# Patient Record
Sex: MALE | Race: WHITE | Employment: OTHER | URBAN - METROPOLITAN AREA
[De-identification: names, ages, dates, MRNs, and addresses within clinical notes are randomized per-mention and may not be internally consistent; named-entity substitution may affect disease eponyms.]

---

## 2017-12-14 ENCOUNTER — HOSPITAL ENCOUNTER (OUTPATIENT)
Dept: PHYSICAL THERAPY | Age: 77
Discharge: HOME | End: 2017-12-14
Payer: MEDICARE

## 2017-12-14 PROCEDURE — G8979 MOBILITY GOAL STATUS: HCPCS | Performed by: PHYSICAL THERAPIST

## 2017-12-14 PROCEDURE — 97110 THERAPEUTIC EXERCISES: CPT | Performed by: PHYSICAL THERAPIST

## 2017-12-14 PROCEDURE — G8978 MOBILITY CURRENT STATUS: HCPCS | Performed by: PHYSICAL THERAPIST

## 2017-12-14 PROCEDURE — 97161 PT EVAL LOW COMPLEX 20 MIN: CPT | Performed by: PHYSICAL THERAPIST

## 2017-12-14 NOTE — PROGRESS NOTES
PT DAILY TREATMENT NOTE/LUMBAR EVAL 3-16    Patient Name: Daniel Luciano  Date:2017  : 1940  [x]  Patient  Verified  [place]or: [place] MED[place] / Plan: [place] MEDICARE PART A / Product Type: Medicare /    In time:12:00  Out time:1:05  Total Treatment Time (min): 65  Total Timed Codes (min): 30  1:1 Treatment Time ( only): 65   Visit #: 1 of 8    Treatment Area: Lumbar pain [M54.5]  SUBJECTIVE  Pain Level (0-10 scale): 5  []constant []intermittent []improving []worsening []no change since onset    Any medication changes, allergies to medications, adverse drug reactions, diagnosis change, or new procedure performed?: [x] No    [] Yes (see summary sheet for update)  Subjective functional status/changes:     Patient has CC of low back pain and radiating pain into R anterior thigh for 2 years length of time. BELLE: denies. Patient describes pain as sharp, intermittent (it is not every day). Pain is worse in PM. Denies numbness/tingling. Denies popping/clicking. Aggravating factors: prolonged standing. Alleviating factors: hits the leg, time, stretching. Denies red flags: SOB, chest pain, dizziness/lightheadedness, blurred/double vision, HA, chills/fevers, night sweats, change in bowel/bladder control, abdominal pain, difficulty swallowing, slurred speech, unexplained weight gain/loss. 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 Surgical Hx: lumbar surgery (~4 years ago), upper back surgery , asbestos plaque removal in lung () . Social Hx: 2 level home (does not need access), alcohol, denies tobacco (hx of light smoking from 20yrs to ), work status: [employer] 15yrs. PLOF: independent with ADLs, sedentary outside of work. CLOF: same.        OBJECTIVE/EXAMINATION    35 min [x]Eval                  []Re-Eval       30 min Therapeutic Exercise:  [x] See flow sheet :   Rationale: increase ROM, increase strength and decrease pain to improve the patients ability to complete ADLs            With   [] TE   [] TA   [] neuro   [] other: Patient Education: [x] Review HEP    [] Progressed/Changed HEP based on:   [] positioning   [] body mechanics   [] transfers   [] heat/ice application    [] other:        Physical Therapy Evaluation - Lumbar Spine ([facility])    General Health:  Red Flags Indicated?  [] Yes    [x] No    Past History/Treatments:     Diagnostic Tests: [] Lab work [] X-rays    [] CT [] MRI     [] Other:  Results:    OBJECTIVE  Posture:  Lateral Shift: [] R    [] L     [] +  [x] -  Kyphosis: [x] Increased [] Decreased   []  WNL  Lordosis:  [] Increased [x] Decreased   [] WNL  Pelvic symmetry: [x] WNL    [] Other:    Gait:  [x] Normal     [] Abnormal:    Active Movements: [] N/A   [] Too acute   [] Other:  ROM % AROM Comments:pain, area   Forward flexion 40-60 75% tightness   Extension 20-30 50% Minor pain   SB right 20-30 50% pain   SB left 20-30 50%    Rotation right 5-10 75%    Rotation left 5-10 75%      Repeated Movements   Effects on present pain: produces (VT), abolishes (A), increases (incr), decreases (decr), centralizes (C), peripheral (PH), no effect (NE)   Pre-Test Sx Flexion Repeated Flexion Extension Repeated Extension   Sitting none       Standing none NE NE NE NE   Lying none           Neuro Screen [] WNL  DTRs 2+ bilaterally symmetric throughout L3/4 and S1    Dural Mobility:  SLR  Supine: [x] R    [] L    [x] +    [] -  @ (degrees):   Slump Test: [x] R    [] L    [x] +    [] -  @ (degrees):   Prone Knee Bend: [] R    [] L    [] +    [x] -     Palpation  [x] Min  [] Mod  [] Severe    Location: PA mobs to lumbar spine  [] Min  [] Mod  [] Severe    Location:  [] Min  [] Mod  [] Severe    Location:    Strength   L(0-5) R (0-5) N/T   Hip Flexion (L1,2) 4 4 []   Knee Extension (L3,4) 5 5 []   Ankle Dorsiflexion (L4) 4 5 []   Great Toe Extension (L5)   [x]   Ankle Plantarflexion (S1)   [x]   Knee Flexion (S1,2) 4 4 []   Upper Abdominals 4 4 []   Lower Abdominals 4 4 []   Paraspinals 4 4 []   Back Rotators 4 4 []   Gluteus Socrates 4- 4- []   Hip abuctors 4 4 []     Special Tests  Lumbar:  Lumb. Compression: [] Pos  [] Neg               Lumbar Distraction:   [] Pos  [] Neg    Quadrant:  [] Pos  [] Neg   [] Flex  [] Ext    Sacroilliac:  Gaenslen's: [] R    [] L    [] +    [x] -     Thigh Thrust: [] R    [] L    [] +    [x] -          Hip: Wander Harvey:  [] R    [] L    [] +    [x] -     Scour:  [] R    [] L    [] +    [x] -     Piriformis: [] R    [] L    [] +    [x] -        Pain Level (0-10 scale) post treatment: 5    ASSESSMENT/Changes in Function: Patient presents with signs/symptoms of R sided lumbar radiculopathy, with apparent extension directional preference. Patient will continue to benefit from skilled PT services to modify and progress therapeutic interventions, address functional mobility deficits, address ROM deficits, address strength deficits, analyze and address soft tissue restrictions, analyze and cue movement patterns, analyze and modify body mechanics/ergonomics and assess and modify postural abnormalities to attain remaining goals.      [x]  See Plan of Care  []  See progress note/recertification  []  See Discharge Summary         Progress towards goals / Updated goals:  See POC    PLAN  []  Upgrade activities as tolerated     [x]  Continue plan of care  []  Update interventions per flow sheet       []  Discharge due to:_  []  Other:_      Amber Thompson PT, DPT 12/14/2017  8:19 AM

## 2017-12-14 NOTE — PROGRESS NOTES
[facility]  [address] [place], [address] [facility] [place]  Ph [phone]  Fx [phone]    Plan of Care/ Statement of Necessity for Physical Therapy Services    Patient name: Juice Cruz Start of Care: 2017   Referral source: Stacey Fleming,* : 1940    Medical Diagnosis: Lumbar pain [M54.5]   Onset Date:2 years    Treatment Diagnosis: low back pain   Prior Hospitalization: see medical history Provider#: [id number]   Medications: Verified on Patient summary List    Comorbidities: pacemaker, hx of tobacco use   Prior Level of Function: independent with ADLs, sedentary outside of work      The Plan of Care and following information is based on the information from the initial evaluation. Assessment/ key information: Patient presents with signs/symptoms of R sided lumbar radiculopathy, with apparent extension directional preference. Patient will continue to benefit from skilled PT services to modify and progress therapeutic interventions, address functional mobility deficits, address ROM deficits, address strength deficits, analyze and address soft tissue restrictions, analyze and cue movement patterns, analyze and modify body mechanics/ergonomics and assess and modify postural abnormalities to attain remaining goals.     Evaluation Complexity History MEDIUM  Complexity : 1-2 comorbidities / personal factors will impact the outcome/ POC ; Examination LOW Complexity : 1-2 Standardized tests and measures addressing body structure, function, activity limitation and / or participation in recreation  ;Presentation LOW Complexity : Stable, uncomplicated  ;Clinical Decision Making MEDIUM Complexity : FOTO score of 26-74  Overall Complexity Rating: LOW   Problem List: pain affecting function, decrease ROM, decrease strength, decrease ADL/ functional abilitiies and decrease activity tolerance   Treatment Plan may include any combination of the following: Therapeutic exercise, Therapeutic activities, Neuromuscular re-education, Physical agent/modality, Gait/balance training, Manual therapy, Aquatic therapy, Patient education and Self Care training  Patient / Family readiness to learn indicated by: asking questions, trying to perform skills and interest  Persons(s) to be included in education: patient (P)  Barriers to Learning/Limitations: None  Patient Goal (s): less pain  Patient Self Reported Health Status: good  Rehabilitation Potential: fair    Short Term Goals: To be accomplished in 2 weeks:   Patient will report compliance with HEP 1x/day to aid in rehabilitation program.    Status at IE: NA   Current:     Patient will increase lumbar ROM to 100% in all planes to aid in completion of ADLs   Status at IE:50% grossly   Current:    Long Term Goals: To be accomplished in 4 weeks:   Patient will increase B LE strength to 5/5 MMT throughout to aid in completion of ADLs   Status at IE: 4/5 grossly   Current:     Patient will report pain no greater than 1-2/10 throughout entire day to aid in completion of ADLs   Status at IE: 3 at lowest   Current:     Patent will be able to stand for 2hr with no pain to be able to complete ADLs and work unrestricted. Status at [address] onset at 45-1hr   Current:     Patient will improve FOTO score to  points to demonstrate improvement in functional status. Status at IE: TBD   Current:    G-Codes (GP)  Mobility  [id number] Current  CK= 40-59%   Goal  CJ= 20-39%    The severity rating is based on clinical judgment and the FOTO score. Frequency / Duration: Patient to be seen 2 times per week for 4 weeks.     Patient/ Caregiver education and instruction: Diagnosis, prognosis, self care, activity modification and exercises   [x]  Plan of care has been reviewed with DARIN Enriquez PT, DPT 12/14/2017 1:27 PM    ________________________________________________________________________    I certify that the above Therapy Services are being furnished while the patient is under my care. I agree with the treatment plan and certify that this therapy is necessary. Physician's Signature:____________________  Date:____________Time: _________    Please sign and return to [facility]  [address], [address]  Ph [phone]  Fx [phone]            ________________________________________________________________________    I certify that the above Therapy Services are being furnished while the patient is under my care. I agree with the treatment plan and certify that this therapy is necessary.     Physician's Signature:____________________  Date:____________Time: _________    Please sign and return to [facility]  [address], [address]  Ph [phone]  Fx [phone]

## 2017-12-19 ENCOUNTER — HOSPITAL ENCOUNTER (OUTPATIENT)
Dept: PHYSICAL THERAPY | Age: 77
Discharge: HOME | End: 2017-12-19
Payer: MEDICARE

## 2017-12-19 PROCEDURE — 97110 THERAPEUTIC EXERCISES: CPT

## 2017-12-19 NOTE — PROGRESS NOTES
PT DAILY TREATMENT NOTE - [facility]     Patient Name: Silas Solares  Date:2017  : 1940  [x]  Patient  Verified  Payor: Linder Cheadle / Plan: [place] MEDICARE PART A / Product Type: Medicare /    In time:230  Out time:221  Total Treatment Time (min): 51  Total Timed Codes (min): 41  1:1 Treatment Time ( only): na   Visit #: 2 of 8    Treatment Area: Lumbar pain [M54.5]    SUBJECTIVE  Pain Level (0-10 scale): 4  Any medication changes, allergies to medications, adverse drug reactions, diagnosis change, or new procedure performed?: [x] No    [] Yes (see summary sheet for update)  Subjective functional status/changes:   [x] No changes reported      OBJECTIVE    Modality rationale: decrease pain and increase tissue extensibility   Min Type Additional Details    [] Estim:  []Unatt       []IFC  []Premod                        []Other:  []w/ice   []w/heat  Position:  Location:    [] Estim: []Att    []TENS instruct  []NMES                    []Other:  []w/US   []w/ice   []w/heat  Position:  Location:    []  Traction: [] Cervical       []Lumbar                       [] Prone          []Supine                       []Intermittent   []Continuous Lbs:  [] before manual  [] after manual    []  Ultrasound: []Continuous   [] Pulsed                           []1MHz   []3MHz W/cm2:  Location:    []  Iontophoresis with dexamethasone         Location: [] Take home patch   [] In clinic   10 []  Ice     [x]  heat  []  Ice massage  []  Laser   []  Anodyne Position:supine  Location:LS    []  Laser with stim  []  Other:  Position:  Location:    []  Vasopneumatic Device Pressure:       [] lo [] med [] hi   Temperature: [] lo [] med [] hi   [] Skin assessment post-treatment:  []intact []redness- no adverse reaction    []redness  adverse reaction:     41 min Therapeutic Exercise:  [] See flow sheet :   Rationale: increase ROM, increase strength and improve coordination           With   [] TE   [] TA   [] neuro   [] other: Patient Education: [x] Review HEP    [] Progressed/Changed HEP based on:   [] positioning   [] body mechanics   [] transfers   [] heat/ice application    [] other:      Other Objective/Functional Measures:   Most pain with fed flexion and bending    Pain Level (0-10 scale) post treatment: 2    ASSESSMENT/Changes in Function:   No relief or increase of symptoms with ext. Increased pain with flexion    Patient will continue to benefit from skilled PT services to modify and progress therapeutic interventions, address functional mobility deficits, address ROM deficits, address strength deficits and analyze and address soft tissue restrictions to attain remaining goals. [x]  See Plan of Care  []  See progress note/recertification  []  See Discharge Summary         Progress towards goals / Updated goals:  Short Term Goals: To be accomplished in 2 weeks:                        Patient will report compliance with HEP 1x/day to aid in rehabilitation program.                         Status at IE: NA                        Current:NT                           Patient will increase lumbar ROM to 100% in all planes to aid in completion of ADLs                        Status at IE:50% grossly                        Current:no change     Long Term Goals: To be accomplished in 4 weeks:                        Patient will increase B LE strength to 5/5 MMT throughout to aid in completion of ADLs                        Status at IE: 4/5 grossly                        Current:NT                           Patient will report pain no greater than 1-2/10 throughout entire day to aid in completion of ADLs                        Status at IE: 3 at lowest                        Current:NT                           Patent will be able to stand for 2hr with no pain to be able to complete ADLs and work unrestricted.                         Status at [address] onset at 45-1hr                        Current:NT                           Patient will improve FOTO score to  points to demonstrate improvement in functional status.                         Status at IE: TBD                        Current:NT       PLAN  [x]  Upgrade activities as tolerated     [x]  Continue plan of care  []  Update interventions per flow sheet       []  Discharge due to:_  []  Other:_      Renay Obando PTA 12/19/2017  4:09 PM    Future Appointments  Date Time Provider Justus Aguilar   12/21/2017 2:30 PM Renay [place], [facility]   12/26/2017 2:30 PM Renay [place], [facility]   12/28/2017 3:00 PM Renay [place], [facility]   1/2/2018 2:00 PM Renay [place], [facility]   1/4/2018 2:30 PM Renay [place], [facility]   1/9/2018 2:00 PM Renay [place], [facility]   1/11/2018 2:00 PM Renay [place], [facility]

## 2017-12-21 ENCOUNTER — HOSPITAL ENCOUNTER (OUTPATIENT)
Dept: PHYSICAL THERAPY | Age: 77
Discharge: HOME | End: 2017-12-21
Payer: MEDICARE

## 2017-12-21 PROCEDURE — 97110 THERAPEUTIC EXERCISES: CPT

## 2017-12-21 NOTE — PROGRESS NOTES
PT DAILY TREATMENT NOTE - [facility]     Patient Name: Jeannine Judge  Date:2017  : 1940  [x]  Patient  Verified  Payor: Dewayne Pacheco / Plan: [place] MEDICARE PART A / Product Type: Medicare /    In time:235  Out time:315  Total Treatment Time (min): 40  Total Timed Codes (min): 30  1:1 Treatment Time ( only): na   Visit #: 3 of 8    Treatment Area: Lumbar pain [M54.5]    SUBJECTIVE  Pain Level (0-10 scale): 5-6  Any medication changes, allergies to medications, adverse drug reactions, diagnosis change, or new procedure performed?: [x] No    [] Yes (see summary sheet for update)  Subjective functional status/changes:   [] No changes reported  \"just came from work, I was standing all day\"    OBJECTIVE    Modality rationale: decrease pain and increase tissue extensibility   Min Type Additional Details    [] Estim:  []Unatt       []IFC  []Premod                        []Other:  []w/ice   []w/heat  Position:  Location:    [] Estim: []Att    []TENS instruct  []NMES                    []Other:  []w/US   []w/ice   []w/heat  Position:  Location:    []  Traction: [] Cervical       []Lumbar                       [] Prone          []Supine                       []Intermittent   []Continuous Lbs:  [] before manual  [] after manual    []  Ultrasound: []Continuous   [] Pulsed                           []1MHz   []3MHz W/cm2:  Location:    []  Iontophoresis with dexamethasone         Location: [] Take home patch   [] In clinic   10 []  Ice     [x]  heat  []  Ice massage  []  Laser   []  Anodyne Position:supine  Location:LS    []  Laser with stim  []  Other:  Position:  Location:    []  Vasopneumatic Device Pressure:       [] lo [] med [] hi   Temperature: [] lo [] med [] hi   [] Skin assessment post-treatment:  []intact []redness- no adverse reaction    []redness  adverse reaction:         30 min Therapeutic Exercise:  [] See flow sheet :   Rationale: increase ROM, increase strength and improve coordination With   [] TE   [] TA   [] neuro   [] other: Patient Education: [x] Review HEP    [] Progressed/Changed HEP based on:   [] positioning   [] body mechanics   [] transfers   [] heat/ice application    [] other:      Other Objective/Functional Measures:   none     Pain Level (0-10 scale) post treatment: 5-6    ASSESSMENT/Changes in Function:   Focus on ext based TE with no significant decrease in symptoms but no increase of symptoms. Most discomfort while performing Add squeeze with ball in hooklying    Patient will continue to benefit from skilled PT services to modify and progress therapeutic interventions, address functional mobility deficits, address ROM deficits and address strength deficits to attain remaining goals.      [x]  See Plan of Care  []  See progress note/recertification  []  See Discharge Summary         Progress towards goals / Updated goals:  Short Term Goals: To be accomplished in 2 weeks:                        Patient will report compliance with HEP 1x/day to aid in rehabilitation program.                         CPYQGN at IE: NA                        Current:NT                            Patient will increase lumbar ROM to 100% in all planes to aid in completion of ADLs                        Status at IE:50% grossly                        Current:no change      Long Term Goals: To be accomplished in 4 weeks:                        Patient will increase B LE strength to 5/5 MMT throughout to aid in completion of ADLs                        Status at IE: 4/5 grossly                        Current:NT                            Patient will report pain no greater than 1-2/10 throughout entire day to aid in completion of ADLs                        Status at IE: 3 at lowest                        Current:NT                            Patent will be able to stand for 2hr with no pain to be able to complete ADLs and work unrestricted.                        Status at [address] onset at 45-1hr                        Current:NT                            [id number] will improve FOTO score to  points to demonstrate improvement in functional status.                         VATOSW at IE: TBD                        Current:NT       PLAN  [x]  Upgrade activities as tolerated     [x]  Continue plan of care  []  Update interventions per flow sheet       []  Discharge due to:_  []  Other:_      Keiko Rivero [facility] 12/21/2017  3:21 PM    Future Appointments  Date Time Provider Justus Aguilar   12/26/2017 2:30 PM Keiko [facility]   12/28/2017 3:00 PM [place], [facility]   1/2/2018 2:00 PM [place], [facility]   1/4/2018 [facility]   1/9/2018 [facility]   [facility]11/2018 [facility]

## 2017-12-26 ENCOUNTER — HOSPITAL ENCOUNTER (OUTPATIENT)
Dept: PHYSICAL THERAPY | Age: 77
Discharge: HOME | End: 2017-12-26
Payer: MEDICARE

## 2017-12-26 PROCEDURE — 97110 THERAPEUTIC EXERCISES: CPT

## 2017-12-26 NOTE — PROGRESS NOTES
PT DAILY TREATMENT NOTE - [facility]     Patient Name: Peter Bazzi  Date:2017  : 1940  [x]  Patient  Verified  Payor: Mohit Solders / Plan: [place] MEDICARE PART A / Product Type: Medicare /    In time:230  Out time:325  Total Treatment Time (min): 55  Total Timed Codes (min): 45  1:1 Treatment Time ( [address] only): na   Visit #: 4 of 8    Treatment Area: Lumbar pain [M54.5]    SUBJECTIVE  Pain Level (0-10 scale):  2  Any medication changes, allergies to medications, adverse drug reactions, diagnosis change, or new procedure performed?: [x] No    [] Yes (see summary sheet for update)  Subjective functional status/changes:   [x] No changes reported  \" I haven't bene doing much today so pain is low\"    OBJECTIVE    Modality rationale: decrease pain and increase tissue extensibility    Min Type Additional Details    [] Estim:  []Unatt       []IFC  []Premod                        []Other:  []w/ice   []w/heat  Position:  Location:    [] Estim: []Att    []TENS instruct  []NMES                    []Other:  []w/US   []w/ice   []w/heat  Position:  Location:    []  Traction: [] Cervical       []Lumbar                       [] Prone          []Supine                       []Intermittent   []Continuous Lbs:  [] before manual  [] after manual    []  Ultrasound: []Continuous   [] Pulsed                           []1MHz   []3MHz W/cm2:  Location:    []  Iontophoresis with dexamethasone         Location: [] Take home patch   [] In clinic   10 []  Ice     [x]  heat  []  Ice massage  []  Laser   []  Anodyne Position:supine  Location:LS    []  Laser with stim  []  Other:  Position:  Location:    []  Vasopneumatic Device Pressure:       [] lo [] med [] hi   Temperature: [] lo [] med [] hi   [] Skin assessment post-treatment:  []intact []redness- no adverse reaction    []redness  adverse reaction:       45 min Therapeutic Exercise:  [] See flow sheet :   Rationale: increase ROM, increase strength and improve coordination With   [] TE   [] TA   [] neuro   [] other: Patient Education: [x] Review HEP    [] Progressed/Changed HEP based on:   [] positioning   [] body mechanics   [] transfers   [] heat/ice application    [] other:      Other Objective/Functional Measures:  none     Pain Level (0-10 scale) post treatment: 2    ASSESSMENT/Changes in Function:   Moderate cuing for form with TE. No increase of pain, maintained 2/10    Patient will continue to benefit from skilled PT services to modify and progress therapeutic interventions, address functional mobility deficits, address ROM deficits and address strength deficits to attain remaining goals.      [x]  See Plan of Care  []  See progress note/recertification  []  See Discharge Summary         Progress towards goals / Updated goals:  Short Term Goals: To be accomplished in 2 weeks:                        ZOVXEFK will report compliance with HEP 1x/day to aid in rehabilitation program.                         BICEBO at IE: NA                        Current:compliant                            Patient will increase lumbar ROM to 100% in all planes to aid in completion of ADLs                        Status at IE:50% grossly                        Current:no change      Long Term Goals: To be accomplished in 4 weeks:                        Patient will increase B LE strength to 5/5 MMT throughout to aid in completion of ADLs                        Status at IE: 4/5 grossly                        Current:NT                            Patient will report pain no greater than 1-2/10 throughout entire day to aid in completion of ADLs                        Status at IE: 3 at lowest                        Current:5-6/10                            Patent will be able to stand for 2hr with no pain to be able to complete ADLs and work unrestricted.                        RTFXLH at [address] onset at 45-1hr                        Current:no change                            Patient will improve FOTO score to  points to demonstrate improvement in functional status.                         WYXVJR at IE: TBD                        Current:NT       PLAN  [x]  Upgrade activities as tolerated     [x]  Continue plan of care  []  Update interventions per flow sheet       []  Discharge due to:_  []  Other:_      Rosa Campa PTA 12/26/2017  2:36 PM    Future Appointments  Date Time Provider Justus Aguilar   12/28/2017 3:00 PM Rosa Campa [facility]   1/2/2018 2:00 PM Rosa Campa [facility]   1/4/2018 2:30 PM Rosa Campa [facility]   1/9/2018 2:00 PM Rosa Campa [facility]   1/11/2018 2:00 PM Rosa Campa [facility]

## 2017-12-28 ENCOUNTER — APPOINTMENT (OUTPATIENT)
Dept: PHYSICAL THERAPY | Age: 77
End: 2017-12-28
Payer: MEDICARE

## 2018-01-02 ENCOUNTER — HOSPITAL ENCOUNTER (OUTPATIENT)
Dept: PHYSICAL THERAPY | Age: 78
Discharge: HOME | End: 2018-01-02
Payer: COMMERCIAL

## 2018-01-02 PROCEDURE — 97110 THERAPEUTIC EXERCISES: CPT

## 2018-01-02 NOTE — PROGRESS NOTES
PT DAILY TREATMENT NOTE - [facility]     Patient Name: Giancarlo Dietrich  Date:2018  : 1940  [x]  Patient  Verified  Payor: Zack Solares / Plan: [facility] MEDICARE PART A / Product Type: Medicare /    In time:150  Out time:240  Total Treatment Time (min): 50  Total Timed Codes (min): 40  1:1 Treatment Time ([address] only): na   Visit #: 5 of 8    Treatment Area: Lumbar pain [M54.5]    SUBJECTIVE  Pain Level (0-10 scale): 2  Any medication changes, allergies to medications, adverse drug reactions, diagnosis change, or new procedure performed?: [x] No    [] Yes (see summary sheet for update)  Subjective functional status/changes:   [] No changes reported  Reports being able to stand for up to 2hrs before increase of symptoms. avg pain 2/10  7/10 at highest.  discussed with pt need for LS support while driving and to take more frequent sitting breaks while at work. OBJECTIVE      40 min Therapeutic Exercise:  [] See flow sheet :   Rationale: increase ROM, increase strength and improve coordination             With   [] TE   [] TA   [] neuro   [] other: Patient Education: [x] Review HEP    [] Progressed/Changed HEP based on:   [] positioning   [] body mechanics   [] transfers   [] heat/ice application    [] other:      Other Objective/Functional Measures:  foto 53/100    pain Level (0-10 scale) post treatment: 1    ASSESSMENT/Changes in Function:  Increased standing tolerance before onset of pain. No increase of pain or radiating symptoms with TE today. Patient will continue to benefit from skilled PT services to modify and progress therapeutic interventions, address functional mobility deficits, address ROM deficits and address strength deficits to attain remaining goals.      [x]  See Plan of Care  []  See progress note/recertification  []  See Discharge Summary         Progress towards goals / Updated goals:  Short Term Goals: To be accomplished in 2 weeks:                        JOY will report compliance with HEP 1x/day to aid in rehabilitation program.                         EBGAXX at IE: NA                        Current:compliant                            Patient will increase lumbar ROM to 100% in all planes to aid in completion of ADLs                        Status at IE:50% grossly                        Current:no change      Long Term Goals: To be accomplished in 4 weeks:                        Patient will increase B LE strength to 5/5 MMT throughout to aid in completion of ADLs                        Status at IE: 4/5 grossly                        Current:NT                            Patient will report pain no greater than 1-2/10 throughout entire day to aid in completion of ADLs                        Status at IE: 3 at lowest                        Current:5-6/10                            Patent will be able to stand for 2hr with no pain to be able to complete ADLs and work unrestricted.                        LIPNTE at [address] onset at [facility] [address]                        Current:up to 2hours                            Patient will improve FOTO score to  points to demonstrate improvement in functional status.                         LHECOT at IE: TBD                        Current:NT       PLAN  [x]  Upgrade activities as tolerated     [x]  Continue plan of care  []  Update interventions per flow sheet       []  Discharge due to:_  []  Other:_      Leroy Zamora PTA 1/2/2018  2:36 PM    Future Appointments  Date Time Provider Justus Aguilar   1/4/2018 2:30 PM Leroy Zamora PTA [facility]   1/9/2018 2:00 PM Leroy Zamora PTA [facility]   1/11/2018 2:00 PM Leroy Zamora PTA [facility]

## 2018-01-04 ENCOUNTER — APPOINTMENT (OUTPATIENT)
Dept: PHYSICAL THERAPY | Age: 78
End: 2018-01-04
Payer: COMMERCIAL

## 2018-01-09 ENCOUNTER — HOSPITAL ENCOUNTER (OUTPATIENT)
Dept: PHYSICAL THERAPY | Age: 78
Discharge: HOME | End: 2018-01-09
Payer: COMMERCIAL

## 2018-01-09 PROCEDURE — 97110 THERAPEUTIC EXERCISES: CPT

## 2018-01-09 NOTE — PROGRESS NOTES
PT DAILY TREATMENT NOTE - [facility]     Patient Name: Darris Gilford  Date:2018  : 1940  [x]  Patient  Verified  Payor: [place] MEDICARE / Plan: [place] MEDICARE PART A / Product Type: Medicare /    In time:2  Out time:3  Total Treatment Time (min): 60  Total Timed Codes (min): 50  1:1 Treatment Time ( [address] only): na   Visit #: 6 of 8    Treatment Area: Lumbar pain [M54.5]    SUBJECTIVE  Pain Level (0-10 scale): 5  Any medication changes, allergies to medications, adverse drug reactions, diagnosis change, or new procedure performed?: [x] No    [] Yes (see summary sheet for update)  Subjective functional status/changes:   [] No changes reported  Increased symptoms cause by shoveling snow    OBJECTIVE    Modality rationale: decrease pain and increase tissue extensibility   Min Type Additional Details    [] Estim:  []Unatt       []IFC  []Premod                        []Other:  []w/ice   []w/heat  Position:  Location:    [] Estim: []Att    []TENS instruct  []NMES                    []Other:  []w/US   []w/ice   []w/heat  Position:  Location:    []  Traction: [] Cervical       []Lumbar                       [] Prone          []Supine                       []Intermittent   []Continuous Lbs:  [] before manual  [] after manual    []  Ultrasound: []Continuous   [] Pulsed                           []1MHz   []3MHz W/cm2:  Location:    []  Iontophoresis with dexamethasone         Location: [] Take home patch   [] In clinic    []  Ice     []  heat  []  Ice massage  []  Laser   []  Anodyne Position:  Location:    []  Laser with stim  []  Other:  Position:  Location:    []  Vasopneumatic Device Pressure:       [] lo [] med [] hi   Temperature: [] lo [] med [] hi   [] Skin assessment post-treatment:  []intact []redness- no adverse reaction    []redness  adverse reaction:       50 min Therapeutic Exercise:  [] See flow sheet :   Rationale: increase ROM, increase strength and improve coordination          With   [] TE   [] TA   [] neuro   [] other: Patient Education: [x] Review HEP    [] Progressed/Changed HEP based on:   [] positioning   [] body mechanics   [] transfers   [] heat/ice application    [] other:      Other Objective/Functional Measures:  Most relief with extension     Pain Level (0-10 scale) post treatment: 2    ASSESSMENT/Changes in Function:   Good response to therapy with decrease of symptoms. Patient will continue to benefit from skilled PT services to address functional mobility deficits, address ROM deficits, address strength deficits and analyze and address soft tissue restrictions to attain remaining goals.      [x]  See Plan of Care  []  See progress note/recertification  []  See Discharge Summary         Progress towards goals / Updated goals:  Short Term Goals: To be accomplished in 2 weeks:                        UIGAWYB will report compliance with HEP 1x/day to aid in rehabilitation program.                         RLJOML at IE: NA                        Current:compliant                            Patient will increase lumbar ROM to 100% in all planes to aid in completion of ADLs                        Status at IE:50% grossly                        Current:no change      Long Term Goals: To be accomplished in 4 weeks:                        Patient will increase B LE strength to 5/5 MMT throughout to aid in completion of ADLs                        Status at IE: 4/5 grossly                        Current:NT                            Patient will report pain no greater than 1-2/10 throughout entire day to aid in completion of ADLs                        Status at IE: 3 at lowest                        Current:5-6/10                            Patent will be able to stand for 2hr with no pain to be able to complete ADLs and work unrestricted.                        CUHHPA at [address] onset at [facility] [address]                        Current:up to 2hours                            Patient will improve FOTO score to  points to demonstrate improvement in functional status.                         AIJWLT at IE: TBD                        Current:NT    PLAN  [x]  Upgrade activities as tolerated     []  Continue plan of care  []  Update interventions per flow sheet       []  Discharge due to:_  []  Other:_      Milagro Goldstein PTA 1/9/2018  4:46 PM    Future Appointments  Date Time Provider Justus Aguilar   1/11/2018 2:00 PM Milagro Goldstein PTA [facility]

## 2018-01-11 ENCOUNTER — HOSPITAL ENCOUNTER (OUTPATIENT)
Dept: PHYSICAL THERAPY | Age: 78
Discharge: HOME | End: 2018-01-11
Payer: COMMERCIAL

## 2018-01-11 PROCEDURE — 97110 THERAPEUTIC EXERCISES: CPT

## 2018-01-11 NOTE — PROGRESS NOTES
PT DAILY TREATMENT NOTE - [facility]     Patient Name: Shara Tinsley  Date:2018  : 1940  [x]  Patient  Verified  Payor: Caitlyn Human / Plan: [place] MEDICARE PART A / Product Type: Medicare /    In time:  Out time:255  Total Treatment Time (min): 53  Total Timed Codes (min): 43  1:1 Treatment Time ( [address] only): na   Visit #: 7 of 8    Treatment Area: Lumbar pain [M54.5]    SUBJECTIVE  Pain Level (0-10 scale):  2  Any medication changes, allergies to medications, adverse drug reactions, diagnosis change, or new procedure performed?: [x] No    [] Yes (see summary sheet for update)  Subjective functional status/changes:   [] No changes reported  \"I've been trying to take more frequent rest breaks\"    OBJECTIVE     Modality rationale: decrease pain and increase tissue extensibility   Min Type Additional Details    [] Estim:  []Unatt       []IFC  []Premod                        []Other:  []w/ice   []w/heat  Position:  Location:    [] Estim: []Att    []TENS instruct  []NMES                    []Other:  []w/US   []w/ice   []w/heat  Position:  Location:    []  Traction: [] Cervical       []Lumbar                       [] Prone          []Supine                       []Intermittent   []Continuous Lbs:  [] before manual  [] after manual    []  Ultrasound: []Continuous   [] Pulsed                           []1MHz   []3MHz W/cm2:  Location:    []  Iontophoresis with dexamethasone         Location: [] Take home patch   [] In clinic   10 []  Ice     [x]  heat  []  Ice massage  []  Laser   []  Anodyne Position:supine  Location:LS    []  Laser with stim  []  Other:  Position:  Location:    []  Vasopneumatic Device Pressure:       [] lo [] med [] hi   Temperature: [] lo [] med [] hi   [] Skin assessment post-treatment:  []intact []redness- no adverse reaction    []redness  adverse reaction:       43 min Therapeutic Exercise:  [] See flow sheet :   Rationale: increase ROM, increase strength and improve coordination With   [] TE   [] TA   [] neuro   [] other: Patient Education: [x] Review HEP    [] Progressed/Changed HEP based on:   [] positioning   [] body mechanics   [] transfers   [] heat/ice application    [] other:      Other Objective/Functional Measures:   none     Pain Level (0-10 scale) post treatment: 2    ASSESSMENT/Changes in Function:   Showing good function and mobility with no increase of symptoms during therapy. Patient will continue to benefit from skilled PT services to modify and progress therapeutic interventions, address functional mobility deficits, address ROM deficits and address strength deficits to attain remaining goals.      [x]  See Plan of Care  []  See progress note/recertification  []  See Discharge Summary         Progress towards goals / Updated goals:  Short Term Goals: To be accomplished in 2 weeks:                        UGTWSIY will report compliance with HEP 1x/day to aid in rehabilitation program.                         DZGKXK at IE: NA                        Current:compliant                            Patient will increase lumbar ROM to 100% in all planes to aid in completion of ADLs                        Status at IE:50% grossly                        Current:no change      Long Term Goals: To be accomplished in 4 weeks:                        Patient will increase B LE strength to 5/5 MMT throughout to aid in completion of ADLs                        Status at IE: 4/5 grossly                        Current:NT                            Patient will report pain no greater than 1-2/10 throughout entire day to aid in completion of ADLs                        Status at IE: 3 at lowest                        Current:5-6/10                            Patent will be able to stand for 2hr with no pain to be able to complete ADLs and work unrestricted.                        HLBFJN at [address] onset at [facility] [address]                        Current:up to 2hours                            Patient will improve FOTO score to  points to demonstrate improvement in functional status.                       SLSVBI at IE: TBD                        Current:NT       PLAN  [x]  Upgrade activities as tolerated     [x]  Continue plan of care  []  Update interventions per flow sheet       []  Discharge due to:_  []  Other:_      Brayan Comp, PTA 1/11/2018  2:48 PM    No future appointments.

## 2018-01-30 ENCOUNTER — APPOINTMENT (OUTPATIENT)
Dept: PHYSICAL THERAPY | Age: 78
End: 2018-01-30
Payer: COMMERCIAL

## 2018-09-27 NOTE — PROGRESS NOTES
[facility] 
[address] [place], [address] [phone]  Fx [phone] Physical Therapy Discharge Summary Patient name: Abimael Busby Start of Care: 2017 Referral source: Andriy Irizarryo,* : 1940 Medical/Treatment Diagnosis: Lumbar pain [M54.5] Onset Date:2 years Prior Hospitalization: see medical history Provider#: [id number] Medications: Verified on Patient Summary List   
Comorbidities: pacemaker, hx tobacco use Prior Level of Function:indpendent w/ ADLs, sedentary outside of work Visits from Start of Care: 7    Missed Visits: 3 Reporting Period : 2017 to 2018 Summary of Care Patient did not return to clinic. Goals unable to be reassessed. ASSESSMENT/RECOMMENDATIONS: 
 
[x]Discontinue therapy: []Patient has reached or is progressing toward set goals [x]Patient has not returned to clinic in greater than 30 days 
   []Due to lack of appreciable progress towards set goals Merlene Bo PT, DPT 2018 10:36 AM

## 2023-08-03 ENCOUNTER — APPOINTMENT (OUTPATIENT)
Facility: HOSPITAL | Age: 83
End: 2023-08-03
Payer: COMMERCIAL

## 2023-08-07 ENCOUNTER — HOSPITAL ENCOUNTER (OUTPATIENT)
Facility: HOSPITAL | Age: 83
Setting detail: RECURRING SERIES
Discharge: HOME | End: 2023-08-10
Payer: COMMERCIAL

## 2023-08-07 PROCEDURE — 97535 SELF CARE MNGMENT TRAINING: CPT

## 2023-08-07 PROCEDURE — 97162 PT EVAL MOD COMPLEX 30 MIN: CPT

## 2023-08-10 ENCOUNTER — APPOINTMENT (OUTPATIENT)
Facility: HOSPITAL | Age: 83
End: 2023-08-10
Payer: COMMERCIAL

## 2023-08-14 ENCOUNTER — HOSPITAL ENCOUNTER (OUTPATIENT)
Facility: HOSPITAL | Age: 83
Setting detail: RECURRING SERIES
Discharge: HOME | End: 2023-08-17
Payer: COMMERCIAL

## 2023-08-14 PROCEDURE — 97110 THERAPEUTIC EXERCISES: CPT

## 2023-08-14 PROCEDURE — 97535 SELF CARE MNGMENT TRAINING: CPT

## 2023-08-14 PROCEDURE — 97112 NEUROMUSCULAR REEDUCATION: CPT

## 2023-08-14 PROCEDURE — 97530 THERAPEUTIC ACTIVITIES: CPT

## 2023-08-14 NOTE — PROGRESS NOTES
PHYSICAL / OCCUPATIONAL THERAPY - DAILY TREATMENT NOTE (updated )    Patient Name: Lorna Madera    Date: 2023    : 1940  Insurance: Payor: Satya Hanson / Plan: [place] / Product Type: *No Product type* /      Patient  verified Yes     Visit #   Current / Total 2 24   Time   In / Out 12:15 1:10   Pain   In / Out 3/10 0/10   Subjective Functional Status/Changes: \"I have some pain in my side but, that's been there for years. \"   Changes to: Allergies, Med Hx, Sx Hx?   no       TREATMENT AREA =  Muscle weakness (generalized) [M62.81]    OBJECTIVE    Therapeutic Procedures: Tx Min Billable or 1:1 Min (if diff from Tx Min) Procedure, Rationale, Specifics   20  Therapeutic Exercise (timed):  increase ROM, strength, coordination, balance, and proprioception to improve patient's ability to progress to PLOF and address remaining functional goals. (see flow sheet as applicable)     Details if applicable:       10 10 60529 Neuromuscular Re-Education (timed):  improve balance, coordination, kinesthetic sense, posture, core stability and proprioception to improve patient's ability to develop conscious control of individual muscles and awareness of position of extremities in order to progress to PLOF and address remaining functional goals. (see flow sheet as applicable)     Details if applicable:     15 15 [id number] Therapeutic Activity (timed):  use of dynamic activities replicating functional movements to increase ROM, strength, coordination, balance, and proprioception in order to improve patient's ability to progress to PLOF and address remaining functional goals. (see flow sheet as applicable)     Details if applicable:     10 10 [id number] Self Care/Home Management (timed):  improve patient knowledge and understanding of positioning and posture/ergonomics  to improve patient's ability to progress to PLOF and address remaining functional goals.   (see flow sheet as applicable)     Details if

## 2023-08-16 ENCOUNTER — HOSPITAL ENCOUNTER (OUTPATIENT)
Facility: HOSPITAL | Age: 83
Setting detail: RECURRING SERIES
Discharge: HOME | End: 2023-08-19
Payer: COMMERCIAL

## 2023-08-16 PROCEDURE — 97530 THERAPEUTIC ACTIVITIES: CPT

## 2023-08-16 PROCEDURE — 97110 THERAPEUTIC EXERCISES: CPT

## 2023-08-16 PROCEDURE — 97112 NEUROMUSCULAR REEDUCATION: CPT

## 2023-08-16 NOTE — PROGRESS NOTES
PHYSICAL / OCCUPATIONAL THERAPY - DAILY TREATMENT NOTE (updated )    Patient Name: Krissy Blood    Date: 2023    : 1940  Insurance: Payor: Alex Pierson / Plan: Lalo Gomez / Product Type: *No Product type* /      Patient  verified Yes     Visit #   Current / Total 3 24   Time   In / Out 1220 110   Pain   In / Out 5 5   Subjective Functional Status/Changes: Pt reported that he doesn't remember any more dizzy episodes   Changes to: Allergies, Med Hx, Sx Hx?   no       TREATMENT AREA =  Muscle weakness (generalized) [M62.81]    OBJECTIVE    Therapeutic Procedures: Tx Min Billable or 1:1 Min (if diff from Tx Min) Procedure, Rationale, Specifics   15 15 99986 Therapeutic Exercise (timed):  increase ROM, strength, coordination, balance, and proprioception to improve patient's ability to progress to PLOF and address remaining functional goals. (see flow sheet as applicable)     Details if applicable:       112 Neuromuscular Re-Education (timed):  improve balance, coordination, kinesthetic sense, posture, core stability and proprioception to improve patient's ability to develop conscious control of individual muscles and awareness of position of extremities in order to progress to PLOF and address remaining functional goals. (see flow sheet as applicable)     Details if applicable:     15 15 54964 Therapeutic Activity (timed):  use of dynamic activities replicating functional movements to increase ROM, strength, coordination, balance, and proprioception in order to improve patient's ability to progress to PLOF and address remaining functional goals.   (see flow sheet as applicable)     Details if applicable:     50 48 [address] Reminder: bill using total billable min of TIMED therapeutic procedures (example: do not include dry needle or estim unattended, both untimed codes, in totals to left)  8-22 min = 1 unit; 23-37 min = 2 units; 38-52 min = 3 units; 53-67 min = 4 units; 68-82 min = 5

## 2023-08-24 ENCOUNTER — HOSPITAL ENCOUNTER (OUTPATIENT)
Facility: HOSPITAL | Age: 83
Setting detail: RECURRING SERIES
Discharge: HOME | End: 2023-08-27
Payer: COMMERCIAL

## 2023-08-24 PROCEDURE — 97530 THERAPEUTIC ACTIVITIES: CPT

## 2023-08-24 PROCEDURE — 97110 THERAPEUTIC EXERCISES: CPT

## 2023-08-24 PROCEDURE — 97112 NEUROMUSCULAR REEDUCATION: CPT

## 2023-08-24 PROCEDURE — 97535 SELF CARE MNGMENT TRAINING: CPT

## 2023-08-24 NOTE — PROGRESS NOTES
PHYSICAL / OCCUPATIONAL THERAPY - DAILY TREATMENT NOTE (updated )    Patient Name: Daryle Andrea    Date: 2023    : 1940  Insurance: Payor: Winnie Treadwell / Plan: Yousuf Cano / Product Type: *No Product type* /      Patient  verified Yes     Visit #   Current / Total 4 24   Time   In / Out 1:50 2:50   Pain   In / Out 0/10 0/10   Subjective Functional Status/Changes: \"I don't have any pain right now. \"   Changes to: Allergies, Med Hx, Sx Hx?   no       TREATMENT AREA =  Muscle weakness (generalized) [M62.81]    OBJECTIVE    Therapeutic Procedures: Tx Min Billable or 1:1 Min (if diff from Tx Min) Procedure, Rationale, Specifics    Therapeutic Exercise (timed):  increase ROM, strength, coordination, balance, and proprioception to improve patient's ability to progress to PLOF and address remaining functional goals. (see flow sheet as applicable)     Details if applicable:       15 15 [id number] Neuromuscular Re-Education (timed):  improve balance, coordination, kinesthetic sense, posture, core stability and proprioception to improve patient's ability to develop conscious control of individual muscles and awareness of position of extremities in order to progress to PLOF and address remaining functional goals. (see flow sheet as applicable)     Details if applicable:     15 15 [id number] Therapeutic Activity (timed):  use of dynamic activities replicating functional movements to increase ROM, strength, coordination, balance, and proprioception in order to improve patient's ability to progress to PLOF and address remaining functional goals. (see flow sheet as applicable)     Details if applicable:     10 10 [id number] Self Care/Home Management (timed):  improve patient knowledge and understanding of pain reducing techniques, positioning, and posture/ergonomics  to improve patient's ability to progress to PLOF and address remaining functional goals.   (see flow sheet as applicable)     Details if applicable:

## 2023-08-28 ENCOUNTER — TELEPHONE (OUTPATIENT)
Facility: HOSPITAL | Age: 83
End: 2023-08-28

## 2023-08-28 ENCOUNTER — APPOINTMENT (OUTPATIENT)
Facility: HOSPITAL | Age: 83
End: 2023-08-28
Payer: COMMERCIAL

## 2023-08-29 ENCOUNTER — APPOINTMENT (OUTPATIENT)
Facility: HOSPITAL | Age: 83
End: 2023-08-29
Payer: COMMERCIAL

## 2023-09-12 ENCOUNTER — TELEPHONE (OUTPATIENT)
Facility: HOSPITAL | Age: 83
End: 2023-09-12

## 2023-09-19 ENCOUNTER — TELEPHONE (OUTPATIENT)
Facility: HOSPITAL | Age: 83
End: 2023-09-19

## 2023-09-19 NOTE — TELEPHONE ENCOUNTER
Spoke with pt to see if he wanted to get on the sched or if he we like to D/c.  Pt stated he would call us back to get sched

## 2023-09-27 ENCOUNTER — TELEPHONE (OUTPATIENT)
Facility: HOSPITAL | Age: 83
End: 2023-09-27

## 2023-09-27 NOTE — TELEPHONE ENCOUNTER
LVM informing pt of 3-call D/C policy.  Informed pt they'd be d/c and to call back if they have any questions